# Patient Record
Sex: MALE | URBAN - METROPOLITAN AREA
[De-identification: names, ages, dates, MRNs, and addresses within clinical notes are randomized per-mention and may not be internally consistent; named-entity substitution may affect disease eponyms.]

---

## 2023-10-25 ENCOUNTER — OFFICE VISIT (OUTPATIENT)
Dept: URGENT CARE | Facility: CLINIC | Age: 48
End: 2023-10-25
Payer: COMMERCIAL

## 2023-10-25 VITALS
OXYGEN SATURATION: 98 % | WEIGHT: 157.2 LBS | DIASTOLIC BLOOD PRESSURE: 87 MMHG | SYSTOLIC BLOOD PRESSURE: 131 MMHG | BODY MASS INDEX: 29.7 KG/M2 | HEART RATE: 84 BPM | TEMPERATURE: 97.8 F

## 2023-10-25 DIAGNOSIS — K12.2 ORAL INFECTION: Primary | ICD-10-CM

## 2023-10-25 PROCEDURE — 99203 OFFICE O/P NEW LOW 30 MIN: CPT | Performed by: PHYSICIAN ASSISTANT

## 2023-10-25 RX ORDER — IBUPROFEN 600 MG/1
600 TABLET ORAL EVERY 8 HOURS PRN
Qty: 21 TABLET | Refills: 0 | Status: SHIPPED | OUTPATIENT
Start: 2023-10-25 | End: 2023-11-01

## 2023-10-25 RX ORDER — PENICILLIN V POTASSIUM 500 MG/1
500 TABLET, FILM COATED ORAL 4 TIMES DAILY
Qty: 40 TABLET | Refills: 0 | Status: SHIPPED | OUTPATIENT
Start: 2023-10-25 | End: 2023-11-04

## 2023-10-25 NOTE — PATIENT INSTRUCTIONS
You likely have a dental infection. I would recommend following up with a dentist as soon as you are able. You may call the phone number on your card to get a list of dentist offices that take your insurance. You may also try [facility] at [phone]. If you develop chest pain, shortness of breath, or fever that cannot be controlled with medication, please report directly to ER.

## 2023-10-25 NOTE — PROGRESS NOTES
"Subjective   Patient ID: Ansley Kwan is a 48 y.o. male.    Patient presents with reports of dental pain on the bottom left side. Has had his Sx for around 4 days. States that hot and cold foods are causing increased pain. Pt does not see a dentist. Has not tried to call a dentist. Last saw a dentist around 20-30 years ago back in [facility]. Denies: fever, chills, headache, body aches, cough, nasal congestion, N/V/D, rash, drainage, bleeding. Denies known breakage of the tooth, but states that there \"may be a big hole.\" The pain is a sharp, throbbing.           The following portions of the chart were reviewed this encounter and updated as appropriate:  Allergies  Meds  Problems  Med Hx  Surg Hx  Fam Hx         Review of Systems   All other systems reviewed and are negative.    Objective   Physical Exam  Constitutional:       General: He is awake.      Appearance: Normal appearance. He is well-developed.   HENT:      Head: Normocephalic and atraumatic.      Mouth/Throat:      Dentition: Dental tenderness (in tooth circled on diagram), gingival swelling and dental caries present. No dental abscesses.      Pharynx: Oropharynx is clear. Uvula midline. No posterior oropharyngeal erythema.      Tonsils: No tonsillar exudate.        Comments: There is a large cavity noted in the center of the circled tooth. There is TTP on palpation of the tooth as well.   Cardiovascular:      Rate and Rhythm: Normal rate.   Pulmonary:      Effort: Pulmonary effort is normal.   Musculoskeletal:      Cervical back: Normal range of motion and neck supple.      Right lower leg: No edema.      Left lower leg: No edema.   Skin:     General: Skin is warm and dry.      Findings: No lesion or rash.   Neurological:      General: No focal deficit present.      Mental Status: He is alert and oriented to person, place, and time.      Cranial Nerves: No facial asymmetry.      Motor: Motor function is intact.      Gait: Gait is intact. "   Psychiatric:         Attention and Perception: Attention normal.         Mood and Affect: Mood and affect normal.           Assessment/Plan   Diagnoses and all orders for this visit:  Oral infection  -     penicillin v potassium (Veetid) 500 mg tablet; Take 1 tablet (500 mg) by mouth 4 times a day for 10 days.  -     ibuprofen 600 mg tablet; Take 1 tablet (600 mg) by mouth every 8 hours if needed for mild pain (1 - 3) or moderate pain (4 - 6) for up to 7 days.
